# Patient Record
Sex: MALE | Race: WHITE | NOT HISPANIC OR LATINO | Employment: OTHER | URBAN - METROPOLITAN AREA
[De-identification: names, ages, dates, MRNs, and addresses within clinical notes are randomized per-mention and may not be internally consistent; named-entity substitution may affect disease eponyms.]

---

## 2021-02-26 ENCOUNTER — APPOINTMENT (EMERGENCY)
Dept: RADIOLOGY | Facility: HOSPITAL | Age: 66
End: 2021-02-26
Payer: MEDICARE

## 2021-02-26 ENCOUNTER — HOSPITAL ENCOUNTER (EMERGENCY)
Facility: HOSPITAL | Age: 66
Discharge: HOME | End: 2021-02-26
Attending: EMERGENCY MEDICINE
Payer: MEDICARE

## 2021-02-26 VITALS
SYSTOLIC BLOOD PRESSURE: 130 MMHG | WEIGHT: 175 LBS | TEMPERATURE: 98.1 F | DIASTOLIC BLOOD PRESSURE: 67 MMHG | RESPIRATION RATE: 18 BRPM | HEART RATE: 67 BPM | OXYGEN SATURATION: 98 %

## 2021-02-26 DIAGNOSIS — S61.218A: Primary | ICD-10-CM

## 2021-02-26 PROCEDURE — 99284 EMERGENCY DEPT VISIT MOD MDM: CPT | Performed by: EMERGENCY MEDICINE

## 2021-02-26 PROCEDURE — 12041 INTMD RPR N-HF/GENIT 2.5CM/<: CPT | Performed by: EMERGENCY MEDICINE

## 2021-02-26 PROCEDURE — 99282 EMERGENCY DEPT VISIT SF MDM: CPT

## 2021-02-26 PROCEDURE — 73140 X-RAY EXAM OF FINGER(S): CPT

## 2021-02-26 RX ORDER — LIDOCAINE HYDROCHLORIDE AND EPINEPHRINE 20; 5 MG/ML; UG/ML
10 INJECTION, SOLUTION EPIDURAL; INFILTRATION; INTRACAUDAL; PERINEURAL ONCE
Status: COMPLETED | OUTPATIENT
Start: 2021-02-26 | End: 2021-02-26

## 2021-02-26 RX ORDER — OMEPRAZOLE 20 MG/1
20 CAPSULE, DELAYED RELEASE ORAL DAILY
COMMUNITY

## 2021-02-26 RX ORDER — ASPIRIN 81 MG/1
81 TABLET, CHEWABLE ORAL DAILY
COMMUNITY

## 2021-02-26 RX ORDER — VENLAFAXINE HYDROCHLORIDE 75 MG/1
75 CAPSULE, EXTENDED RELEASE ORAL EVERY 24 HOURS
COMMUNITY

## 2021-02-26 RX ORDER — AMOXICILLIN AND CLAVULANATE POTASSIUM 875; 125 MG/1; MG/1
1 TABLET, FILM COATED ORAL EVERY 12 HOURS
Qty: 10 TABLET | Refills: 0 | Status: SHIPPED | OUTPATIENT
Start: 2021-02-26 | End: 2021-03-03

## 2021-02-26 RX ORDER — ROSUVASTATIN CALCIUM 40 MG/1
40 TABLET, COATED ORAL DAILY
COMMUNITY

## 2021-02-26 RX ORDER — EZETIMIBE 10 MG/1
10 TABLET ORAL
COMMUNITY

## 2021-02-26 RX ADMIN — LIDOCAINE HYDROCHLORIDE AND EPINEPHRINE 10 ML: 20; 5 INJECTION, SOLUTION EPIDURAL; INFILTRATION; INTRACAUDAL; PERINEURAL at 15:15

## 2021-02-26 NOTE — ED PROVIDER NOTES
History  Chief Complaint   Patient presents with    Laceration     Pt reports cutting his left middle finger on a saw an hour ago  HPI  72year old men presents with laceration of left middle finger distal phalanx  He states he was working with wood and injured himself with electrical tool  Initially had a heavy bleeding which he stopped applying multiple layers of tape  Patient is on brilinta and aspirin  Patient states his tetanus immunization is up to date  Prior to Admission Medications   Prescriptions Last Dose Informant Patient Reported? Taking?   aspirin 81 mg chewable tablet 2/26/2021 at Unknown time  Yes Yes   Sig: Chew 81 mg daily   ezetimibe (ZETIA) 10 mg tablet 2/26/2021 at Unknown time  Yes Yes   Sig: Take 10 mg by mouth daily after dinner   metoprolol tartrate (LOPRESSOR) 25 mg tablet 2/26/2021 at Unknown time  Yes Yes   Sig: Take 12 5 mg by mouth Every 12 hours   omeprazole (PriLOSEC) 20 mg delayed release capsule 2/26/2021 at Unknown time  Yes Yes   Sig: Take 20 mg by mouth daily   rosuvastatin (CRESTOR) 40 MG tablet 2/26/2021 at Unknown time  Yes Yes   Sig: Take 40 mg by mouth daily   ticagrelor (BRILINTA) 90 MG 2/26/2021 at Unknown time  Yes Yes   Sig: Take 90 mg by mouth 2 (two) times a day   venlafaxine (EFFEXOR-XR) 75 mg 24 hr capsule 2/26/2021 at Unknown time  Yes Yes   Sig: Take 75 mg by mouth every 24 hours      Facility-Administered Medications: None       History reviewed  No pertinent past medical history  Past Surgical History:   Procedure Laterality Date    CARDIAC SURGERY      CORONARY ANGIOPLASTY WITH STENT PLACEMENT         History reviewed  No pertinent family history  I have reviewed and agree with the history as documented  E-Cigarette/Vaping    E-Cigarette Use Never User      E-Cigarette/Vaping Substances     Social History     Tobacco Use    Smoking status: Never Smoker    Smokeless tobacco: Never Used   Substance Use Topics    Alcohol use:  Yes Frequency: Monthly or less    Drug use: Never        Review of Systems   Constitutional: Negative for fever  HENT: Negative  Eyes: Negative  Respiratory: Negative  Cardiovascular: Negative  Negative for chest pain, palpitations and leg swelling  Musculoskeletal: Negative for joint swelling  Patient complains of pain in his middle finger distal falanx  He states he had a lot of bleeding after the injury  Skin: Negative for rash  Neurological: Negative for syncope and headaches  Psychiatric/Behavioral: Negative  Negative for agitation  The patient is not nervous/anxious  Physical Exam  ED Triage Vitals [02/26/21 1400]   Temperature Pulse Respirations Blood Pressure SpO2   98 1 °F (36 7 °C) 67 18 130/67 98 %      Temp src Heart Rate Source Patient Position - Orthostatic VS BP Location FiO2 (%)   -- Monitor -- Right arm --      Pain Score       --             Orthostatic Vital Signs  Vitals:    02/26/21 1400   BP: 130/67   Pulse: 67       Physical Exam  Constitutional:       General: He is not in acute distress  Appearance: He is not ill-appearing or toxic-appearing  HENT:      Head: Normocephalic and atraumatic  Cardiovascular:      Rate and Rhythm: Normal rate and regular rhythm  Heart sounds: Normal heart sounds  Pulmonary:      Effort: Pulmonary effort is normal       Breath sounds: Normal breath sounds  Musculoskeletal: Normal range of motion  General: Tenderness and signs of injury present  No swelling or deformity  Right lower leg: No edema  Left lower leg: No edema  Comments: Deep laceration of left distal phalanx of the left middle finger apretiated  Currently not bleeding  There is diastasis between nail and finger with subcutaneous tissue exposure  Skin:     General: Skin is warm and dry  Neurological:      Mental Status: He is alert and oriented to person, place, and time     Psychiatric:         Mood and Affect: Mood normal  ED Medications  Medications   lidocaine-epinephrine (XYLOCAINE-MPF/EPINEPHRINE) 2 %-1:200,000 injection 10 mL (10 mL Infiltration Given by Other 2/26/21 1515)       Diagnostic Studies  Results Reviewed     None                 XR finger third digit-middle LEFT   Final Result by Heydi Lloyd DO (02/26 1516)      Mildly displaced tuft fracture 3rd distal phalanx  Given overlying soft tissue irregularity, correlation for open fracture is necessary  No discernible radiopaque foreign bodies  Workstation performed: [id number]               Procedures  Laceration repair    Date/Time: 2/26/2021 4:28 PM  Performed by: Dony Bertrand MD  Authorized by: Dony Bertrand MD   Consent: Verbal consent obtained  Risks and benefits: risks, benefits and alternatives were discussed  Consent given by: patient  Body area: upper extremity  Location details: left long finger  Laceration length: 2 cm  Foreign bodies: no foreign bodies  Tendon involvement: none  Nerve involvement: none  Vascular damage: no  Anesthesia: digital block    Anesthesia:  Local Anesthetic: lidocaine 2% with epinephrine      Procedure Details:  Preparation: Patient was prepped and draped in the usual sterile fashion  Irrigation solution: tap water  Amount of cleaning: standard  Skin closure: 4-0 nylon  Number of sutures: 5  Technique: simple  Approximation: close  Approximation difficulty: complex  Patient tolerance: patient tolerated the procedure well with no immediate complications  Comments: There is a complex laceration through the distal finger pad which was closed with three 4-0 nylon sutures  Two additional sutures were placed through the nailbed into the finger pad to repair a laceration through the distal finger which cut obliquely underneath the distal nailbed  ED Course     3rd finger  X-ray showed bone demmage     Patient got repair of laceration with 5 stichures: 3 of which were on the soft tissues of the tip of the finger and  2 of which were put through the fingernail  Patient was prescribed augmentin for five days  He will follow with orthopedic surgeon in three days  MDM    Disposition  Final diagnoses:   Laceration of middle finger     Time reflects when diagnosis was documented in both MDM as applicable and the Disposition within this note     Time User Action Codes Description Comment    2/26/2021  3:26 PM SudMony tolbert Add [Z88 1] Allergic reaction to Augmentin     2/26/2021  3:26 PM SudMony tolbert Remove [Z88 1] Allergic reaction to Augmentin     2/26/2021  3:26 PM SudMony tolbert Add [M03 547O] Laceration of middle finger       ED Disposition     ED Disposition Condition Date/Time Comment    Discharge Stable Fri Feb 26, 2021  3:31 PM Jordan Teresayony discharge to home/self care  Follow-up Information     Follow up With Specialties Details Why Contact Info    Burgess Denice MD Orthopedic Surgery, Hand Surgery, Orthopedics In 3 days  [address] [address]  [phone]            Patient's Medications   Discharge Prescriptions    AMOXICILLIN-CLAVULANATE (AUGMENTIN) 875-125 MG PER TABLET    Take 1 tablet by mouth every 12 (twelve) hours for 5 days       Start Date: 2/26/2021 End Date: 3/3/2021       Order Dose: 1 tablet       Quantity: 10 tablet    Refills: 0     No discharge procedures on file  PDMP Review     None           ED Provider  Attending physically available and evaluated Jordan Rangel  I managed the patient along with the ED Attending  Electronically Signed by         Nelson Quinteros MD  02/26/21 0179      I have seen and examined this patient  Please refer to the AP note for complete details  Finger laceration, complex, L non-dominant hand in a   closed by me (see procedure note above, I performed the procedure)  Wound irrigated and closed, abx, f/u hand surgery, dressing applied by ED nurse        Peggy Bradshaw, MD  02/26/21 5162

## 2021-02-26 NOTE — ED NOTES
Patient is resting comfortably  "I only came in because it wouldn't stop bleeding"  Pt took all medications today as directed       Marjorie Carlos, RN  02/26/21 6043